# Patient Record
Sex: MALE | HISPANIC OR LATINO | Employment: UNEMPLOYED | URBAN - METROPOLITAN AREA
[De-identification: names, ages, dates, MRNs, and addresses within clinical notes are randomized per-mention and may not be internally consistent; named-entity substitution may affect disease eponyms.]

---

## 2020-08-20 ENCOUNTER — HOSPITAL ENCOUNTER (EMERGENCY)
Facility: HOSPITAL | Age: 30
Discharge: HOME | End: 2020-08-20
Attending: EMERGENCY MEDICINE

## 2020-08-20 VITALS
HEART RATE: 79 BPM | TEMPERATURE: 99 F | SYSTOLIC BLOOD PRESSURE: 147 MMHG | OXYGEN SATURATION: 100 % | RESPIRATION RATE: 20 BRPM | HEIGHT: 63 IN | WEIGHT: 200 LBS | BODY MASS INDEX: 35.44 KG/M2 | DIASTOLIC BLOOD PRESSURE: 88 MMHG

## 2020-08-20 DIAGNOSIS — M10.9 ACUTE GOUT OF RIGHT KNEE, UNSPECIFIED CAUSE: Primary | ICD-10-CM

## 2020-08-20 PROCEDURE — 63600175 PHARM REV CODE 636 W HCPCS: Performed by: PHYSICIAN ASSISTANT

## 2020-08-20 PROCEDURE — 99284 EMERGENCY DEPT VISIT MOD MDM: CPT | Mod: 25

## 2020-08-20 PROCEDURE — 96372 THER/PROPH/DIAG INJ SC/IM: CPT

## 2020-08-20 PROCEDURE — 63600175 PHARM REV CODE 636 W HCPCS: Performed by: EMERGENCY MEDICINE

## 2020-08-20 PROCEDURE — 25000003 PHARM REV CODE 250: Performed by: EMERGENCY MEDICINE

## 2020-08-20 RX ORDER — COLCHICINE 0.6 MG/1
0.6 TABLET ORAL ONCE
Qty: 1 TABLET | Refills: 0 | OUTPATIENT
Start: 2020-08-20 | End: 2020-11-11

## 2020-08-20 RX ORDER — DEXAMETHASONE SODIUM PHOSPHATE 4 MG/ML
12 INJECTION, SOLUTION INTRA-ARTICULAR; INTRALESIONAL; INTRAMUSCULAR; INTRAVENOUS; SOFT TISSUE
Status: COMPLETED | OUTPATIENT
Start: 2020-08-20 | End: 2020-08-20

## 2020-08-20 RX ORDER — ALLOPURINOL 100 MG/1
100 TABLET ORAL
Status: COMPLETED | OUTPATIENT
Start: 2020-08-20 | End: 2020-08-20

## 2020-08-20 RX ORDER — ALLOPURINOL 100 MG/1
100 TABLET ORAL DAILY
Qty: 30 TABLET | Refills: 0 | Status: SHIPPED | OUTPATIENT
Start: 2020-08-20

## 2020-08-20 RX ORDER — COLCHICINE 0.6 MG/1
1.2 TABLET, FILM COATED ORAL
Status: COMPLETED | OUTPATIENT
Start: 2020-08-20 | End: 2020-08-20

## 2020-08-20 RX ORDER — KETOROLAC TROMETHAMINE 30 MG/ML
60 INJECTION, SOLUTION INTRAMUSCULAR; INTRAVENOUS
Status: COMPLETED | OUTPATIENT
Start: 2020-08-20 | End: 2020-08-20

## 2020-08-20 RX ORDER — HYDROMORPHONE HYDROCHLORIDE 2 MG/ML
1 INJECTION, SOLUTION INTRAMUSCULAR; INTRAVENOUS; SUBCUTANEOUS
Status: DISCONTINUED | OUTPATIENT
Start: 2020-08-20 | End: 2020-08-20

## 2020-08-20 RX ADMIN — ALLOPURINOL 100 MG: 100 TABLET ORAL at 02:08

## 2020-08-20 RX ADMIN — DEXAMETHASONE SODIUM PHOSPHATE 12 MG: 4 INJECTION, SOLUTION INTRA-ARTICULAR; INTRALESIONAL; INTRAMUSCULAR; INTRAVENOUS; SOFT TISSUE at 02:08

## 2020-08-20 RX ADMIN — KETOROLAC TROMETHAMINE 60 MG: 30 INJECTION, SOLUTION INTRAMUSCULAR at 02:08

## 2020-08-20 RX ADMIN — COLCHICINE 1.2 MG: 0.6 TABLET, FILM COATED ORAL at 02:08

## 2020-08-20 NOTE — ED PROVIDER NOTES
Encounter Date: 8/20/2020       History     Chief Complaint   Patient presents with    Knee Pain     Pt c/o bilateral knee pain x1 month ago and become worse. Pt has a hx of gout, but denies any swelling in knees.     Chief Complaint:  Knee pain  History of  Present Illness: History obtained from patient. This 30 y.o. male who has past medical history gout presents to the ED complaining of intermittent right knee pain for month worsening over the last 2 days.  Patient reports associated knee swelling.  Patient states that he went to his primary care doctor today and received colchicine but has not had any relief.  Pain is 10/10 and worse with movement.  Denies fever, numbness, tingling, weakness.          Review of patient's allergies indicates:  No Known Allergies  No past medical history on file.  No past surgical history on file.  No family history on file.  Social History     Tobacco Use    Smoking status: Not on file   Substance Use Topics    Alcohol use: Not on file    Drug use: Not on file     Review of Systems   Constitutional: Negative for chills and fever.   HENT: Negative for congestion, rhinorrhea and sore throat.    Eyes: Negative for visual disturbance.   Respiratory: Negative for cough and shortness of breath.    Cardiovascular: Negative for chest pain.   Gastrointestinal: Negative for abdominal pain, diarrhea, nausea and vomiting.   Genitourinary: Negative for dysuria, frequency and hematuria.   Musculoskeletal: Positive for arthralgias. Negative for back pain.   Skin: Negative for rash.   Neurological: Negative for dizziness, weakness and headaches.       Physical Exam     Initial Vitals [08/20/20 0155]   BP Pulse Resp Temp SpO2   (S) (!) 153/101 88 18 98.4 °F (36.9 °C) 97 %      MAP       --         Physical Exam    Nursing note and vitals reviewed.  Constitutional: He appears well-developed and well-nourished. No distress.   HENT:   Head: Normocephalic and atraumatic.   Right Ear: Tympanic  membrane normal.   Left Ear: Tympanic membrane normal.   Nose: Nose normal.   Mouth/Throat: Uvula is midline, oropharynx is clear and moist and mucous membranes are normal.   Eyes: EOM are normal. Pupils are equal, round, and reactive to light.   Neck: Trachea normal, normal range of motion, full passive range of motion without pain and phonation normal. Neck supple. No stridor present. No spinous process tenderness and no muscular tenderness present. Normal range of motion present. No neck rigidity.   Cardiovascular: Normal rate, regular rhythm and normal heart sounds. Exam reveals no gallop and no friction rub.    No murmur heard.  Pulses:       Dorsalis pedis pulses are 2+ on the right side and 2+ on the left side.        Posterior tibial pulses are 2+ on the right side and 2+ on the left side.   Pulmonary/Chest: Effort normal and breath sounds normal. No respiratory distress. He has no wheezes. He has no rhonchi. He has no rales.   Abdominal: Soft. Bowel sounds are normal. He exhibits no mass. There is no abdominal tenderness. There is no rebound and no guarding.   Musculoskeletal: Normal range of motion.      Comments: There is edema, increased warmth and tenderness to the right knee.  There is no pain with micro motion but patient reports increased pain with range of motion.  Able to passively range of motion the knee secondary pain.  No significant abnormalities to the left knee.   Neurological: He is alert and oriented to person, place, and time. He has normal strength. No cranial nerve deficit or sensory deficit.   Skin: Skin is warm and dry. Capillary refill takes less than 2 seconds.   Psychiatric: He has a normal mood and affect.         ED Course   Procedures  Labs Reviewed - No data to display       Imaging Results          X-Ray Knee 1 or 2 View Right (Final result)  Result time 08/20/20 02:54:18    Final result by Justino Arizmendi MD (08/20/20 02:54:18)                 Impression:      No  radiographic evidence of acute osseous injury of the right knee.  Small suprapatellar joint effusion.      Electronically signed by: Justino Arizmendi MD  Date:    2020  Time:    02:54             Narrative:    EXAMINATION:  XR KNEE 1 OR 2 VIEW RIGHT    CLINICAL HISTORY:  knee pain;    TECHNIQUE:  AP and lateral views of the right knee were performed.    COMPARISON:  None    FINDINGS:  No evidence of acute fracture or dislocation.  Joint spaces appear relatively well maintained.  There is a small suprapatellar joint effusion present.                                 Medical Decision Making:   Initial Assessment:   This is an evaluation of a 30-year-old male with past medical history of gout who presents the emergency department complaining of intermittent right knee pain for 1 month with worsening over last 2 days.  Initial physical exam shows edema, increased warmth and tenderness to the right knee.  No open lacerations.  No erythema.  Patient is neurovascularly intact.  Unable to passively range of motion the knee secondary to pain.  Will provide analgesics and reassessed.  I suspect etiology may be secondary to gout.  However obtain x-ray to rule out acute bony abnormalities.  Differential Diagnosis:   Differential diagnosis includes is not limited to:  Gout, fracture, dislocation, septic joint, ligamentous injury              Attending Attestation:     Physician Attestation Statement for NP/PA:   I have conducted a face to face encounter with this patient in addition to the NP/PA, due to NP/PA Request    Other NP/PA Attestation Additions:      Medical Decision Makin yo male with history of gout presents with acute on chronic R knee pain and swelling. Patient went to PMD today and had Colchicine PO without relief. No acute trauma such as fall or sports injury.     Ddx includes sprain/strain, meniscal tear, gout flare, septic joint, pathologic fracture, other.     On exam, nontoxic adult male, ambulating  on crutches.  R knee +mildly swollen, no skin edema, not erythematous, not warm. Small suprapatellar effusion. Patella is not ballotable.     XR shows no acute bony injury.     Patient had Dexamethasone 12mg IM, Allopurinol 100mg PO, Colchicine 1.2mg PO, Toradol 60mg IM. He is able to range the knee slightly; my suspicion for septic joint is low. I suspect gout flare. We have rx'ed Allopurinol and Colchicine.     Milly Bates                                 Clinical Impression:       ICD-10-CM ICD-9-CM   1. Acute gout of right knee, unspecified cause  M10.9 274.01             ED Disposition Condition    Discharge Stable        ED Prescriptions     Medication Sig Dispense Start Date End Date Auth. Provider    colchicine (COLCRYS) 0.6 mg tablet () Take 1 tablet (0.6 mg total) by mouth once. for 1 dose 1 tablet 2020 Christoph Padron PA-C    allopurinoL (ZYLOPRIM) 100 MG tablet Take 1 tablet (100 mg total) by mouth once daily. 30 tablet 2020  Christoph Padron PA-C        Follow-up Information     Follow up With Specialties Details Why Contact Info    [facility]  Schedule an appointment as soon as possible for a visit in 1 day For reevaluation [address] [place] [address]  [phone]      [facility] Go in 1 day If symptoms worsen [address] [facility] [id number] [phone]                                     Milly Bates MD  20 [id number]